# Patient Record
(demographics unavailable — no encounter records)

---

## 2025-07-24 NOTE — HEALTH RISK ASSESSMENT
[No] : No [1 or 2 (0 pts)] : 1 or 2 (0 points) [Never (0 pts)] : Never (0 points) [0] : 2) Feeling down, depressed, or hopeless: Not at all (0) [PHQ-2 Negative - No further assessment needed] : PHQ-2 Negative - No further assessment needed [Never] : Never [Audit-CScore] : 0 [[id number]] : 0

## 2025-07-24 NOTE — HISTORY OF PRESENT ILLNESS
[de-identified] : 19-year-old female presenting today to f/u She has a history of very significant OCD.  She has been in therapy before but it has not been very successful for her. She lives at home with her parents and is in college, has a hard time with them for some social relationships but does have some online support

## 2025-07-24 NOTE — HEALTH RISK ASSESSMENT
[No] : No [1 or 2 (0 pts)] : 1 or 2 (0 points) [Never (0 pts)] : Never (0 points) [0] : 2) Feeling down, depressed, or hopeless: Not at all (0) [PHQ-2 Negative - No further assessment needed] : PHQ-2 Negative - No further assessment needed [Never] : Never [Audit-CScore] : 0 [[id number]] : 0

## 2025-07-24 NOTE — HISTORY OF PRESENT ILLNESS
[de-identified] : 19-year-old female presenting today to f/u She has a history of very significant OCD.  She has been in therapy before but it has not been very successful for her. She lives at home with her parents and is in college, has a hard time with them for some social relationships but does have some online support